# Patient Record
Sex: FEMALE | Race: WHITE | Employment: FULL TIME | URBAN - METROPOLITAN AREA
[De-identification: names, ages, dates, MRNs, and addresses within clinical notes are randomized per-mention and may not be internally consistent; named-entity substitution may affect disease eponyms.]

---

## 2021-11-09 ENCOUNTER — OFFICE VISIT (OUTPATIENT)
Dept: NEUROLOGY | Age: 41
End: 2021-11-09
Payer: COMMERCIAL

## 2021-11-09 VITALS
OXYGEN SATURATION: 99 % | HEIGHT: 66 IN | DIASTOLIC BLOOD PRESSURE: 70 MMHG | WEIGHT: 140 LBS | HEART RATE: 62 BPM | RESPIRATION RATE: 16 BRPM | SYSTOLIC BLOOD PRESSURE: 118 MMHG | BODY MASS INDEX: 22.5 KG/M2

## 2021-11-09 DIAGNOSIS — M54.81 OCCIPITAL NEURALGIA OF LEFT SIDE: ICD-10-CM

## 2021-11-09 DIAGNOSIS — R51.9 PERSISTENT HEADACHES: ICD-10-CM

## 2021-11-09 DIAGNOSIS — R07.0 PAIN IN THROAT: ICD-10-CM

## 2021-11-09 DIAGNOSIS — H92.02 EAR PAIN, LEFT: ICD-10-CM

## 2021-11-09 DIAGNOSIS — R26.81 UNSTEADY GAIT: ICD-10-CM

## 2021-11-09 DIAGNOSIS — H53.9 VISION DISTURBANCE: Primary | ICD-10-CM

## 2021-11-09 PROCEDURE — 99204 OFFICE O/P NEW MOD 45 MIN: CPT | Performed by: PSYCHIATRY & NEUROLOGY

## 2021-11-09 RX ORDER — BISMUTH SUBSALICYLATE 262 MG
1 TABLET,CHEWABLE ORAL DAILY
COMMUNITY

## 2021-11-09 RX ORDER — IBUPROFEN 200 MG
200 TABLET ORAL
COMMUNITY

## 2021-11-09 RX ORDER — OMEGA-3 FATTY ACIDS/FISH OIL 300-500 MG
CAPSULE ORAL
COMMUNITY

## 2021-11-09 NOTE — PROGRESS NOTES
Chief Complaint   Patient presents with    New Patient     headaches for about a year, she states she has seen ENT, chiroprator, PCP and a eye doctor, with no relief or DX, self referred                                                                 Visit Vitals  /70 (BP 1 Location: Left upper arm, BP Patient Position: Sitting)   Pulse 62   Resp 16   Ht 5' 6\" (1.676 m)   Wt 63.5 kg (140 lb)   SpO2 99%   BMI 22.60 kg/m²

## 2021-11-09 NOTE — PATIENT INSTRUCTIONS
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 Follow up will be as needed (pt to schedule).

## 2021-11-09 NOTE — PROGRESS NOTES
Josef Crisostomo (1980) is a 41 y.o. 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 In Spring 2021, started to have headaches across back/ base of head. No help with Ibuprofen. Started to have some visual changes/ difficulty but not blurred vision, double vision, loss of vision. Saw Ophthalmologist last month. Pt says eye exam was normal, no papilledema. No prior hx of headaches, migraine. Reports that headaches began around 1 yr ago. ER RN. Says that she started out with cervical ymphadenopathy that she attributed to re-wearing P100 masks. Saw PCP, nasal swab came + for Klebsiella. Tried Augmentin x1, no help. Went to see ENT, found inclusion cyst, had imaging. CTA neck was reportedly negative. Also feels unsteady when headaches intensity is increased. No neuro-imaging yet (CT Head or MRI Brain). When it's severe, wakes up middle of night    Labs: no labs available for review      Review of Systems: frequent headaches     ==================================================    No Known Allergies    Current Outpatient Medications   Medication Sig Dispense Refill    ibuprofen (MOTRIN) 200 mg tablet Take 200 mg by mouth every six (6) hours as needed for Pain.  fish oil-omega-3 fatty acids (Fish OiL) 300-500 mg cap Take  by mouth.  multivitamin (ONE A DAY) tablet Take 1 Tablet by mouth daily. No past medical history on file. No past surgical history on file. family history is not on file.            PHYSICAL EXAM    Vitals:    11/09/21 0804   BP: 118/70   BP 1 Location: Left upper arm   BP Patient Position: Sitting   Pulse: 62   Resp: 16   Height: 5' 6\" (1.676 m)   Weight: 63.5 kg (140 lb)   SpO2: 99%       General:  Head: atraumatic. Eyes: Conjunctivae and cornea clear. Vascular/ Carotid Arteries: not examined. Extremities: no edema. Skin: no rashes. No right or left occipital or suboccipital tenderness  No tenderness on either side of head/ temple area    Neurologic Exam:  Speech/ Language: normal.   Alertness: oriented x 3.  CNs: Smell: not tested. Visual Fields (II): full to confrontation. Pupils (II): equal, round, reactive to light. Funduscopic: no papilledema on either side. Extraocular movements (III, IV, VI): conjugate in all directions, no CASSI. Ptosis (III, VII): none. Facial Sensation (V): intact to LT on both sides. Facial Movements (VII): symmetric at rest and on activation. Hearing (VIII): normal.  Soft palate elevation (IX): symmetric, no droop. Shoulder shrug (XI): symmetric, strong. Tongue protrusion (XII): midline    Motor:  5/5 x 4 exts. Sensory: intact LT, prick in all exts. Cerebellar: no resting, postural or intention tremors. Normal H-Shin and FNF bilateral. Deep Tendon Reflexes: trace biceps, 1+ patellars (symmetric). Plantar response: not tested. Gait: normal, including tandem. Romberg: negative      ==================================================    ASSESSMENT/ PLAN:       ICD-10-CM ICD-9-CM    1. Vision disturbance  H53.9 368.9    2. Unsteady gait  R26.81 781.2    3. Persistent headaches  R51.9 784.0    4. Occipital neuralgia of left side  M54.81 723.8    5. Ear pain, left  H92.02 388.70    6.  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 Follow up will be as needed (pt to schedule). An electronic signature was used to authenticate this note.   -- Chris Brown MD

## 2022-07-20 ENCOUNTER — TRANSCRIBE ORDER (OUTPATIENT)
Dept: SCHEDULING | Age: 42
End: 2022-07-20

## 2022-07-20 DIAGNOSIS — H53.453 OTHER LOCALIZED VISUAL FIELD DEFECT, BILATERAL: Primary | ICD-10-CM

## 2022-07-21 ENCOUNTER — HOSPITAL ENCOUNTER (OUTPATIENT)
Dept: MRI IMAGING | Age: 42
Discharge: HOME | End: 2022-07-21
Attending: OPHTHALMOLOGY
Payer: COMMERCIAL

## 2022-07-21 DIAGNOSIS — H53.453 OTHER LOCALIZED VISUAL FIELD DEFECT, BILATERAL: ICD-10-CM

## 2022-07-21 PROCEDURE — 70543 MRI ORBT/FAC/NCK W/O &W/DYE: CPT

## 2022-07-21 PROCEDURE — 74011250636 HC RX REV CODE- 250/636: Performed by: RADIOLOGY

## 2022-07-21 PROCEDURE — A9576 INJ PROHANCE MULTIPACK: HCPCS | Performed by: RADIOLOGY

## 2022-07-21 RX ADMIN — GADOTERIDOL 13 ML: 279.3 INJECTION, SOLUTION INTRAVENOUS at 14:07
